# Patient Record
Sex: FEMALE | Race: BLACK OR AFRICAN AMERICAN
[De-identification: names, ages, dates, MRNs, and addresses within clinical notes are randomized per-mention and may not be internally consistent; named-entity substitution may affect disease eponyms.]

---

## 2020-09-08 ENCOUNTER — HOSPITAL ENCOUNTER (OUTPATIENT)
Dept: HOSPITAL 74 - JASU-SURG | Age: 40
Discharge: HOME | End: 2020-09-08
Attending: OBSTETRICS & GYNECOLOGY
Payer: COMMERCIAL

## 2020-09-08 VITALS — HEART RATE: 90 BPM | TEMPERATURE: 97.7 F | SYSTOLIC BLOOD PRESSURE: 110 MMHG | DIASTOLIC BLOOD PRESSURE: 63 MMHG

## 2020-09-08 VITALS — BODY MASS INDEX: 36.2 KG/M2

## 2020-09-08 DIAGNOSIS — D25.0: Primary | ICD-10-CM

## 2020-09-08 DIAGNOSIS — N85.00: ICD-10-CM

## 2020-09-08 DIAGNOSIS — N84.0: ICD-10-CM

## 2020-09-08 DIAGNOSIS — N92.0: ICD-10-CM

## 2020-09-08 PROCEDURE — 0UDB8ZX EXTRACTION OF ENDOMETRIUM, VIA NATURAL OR ARTIFICIAL OPENING ENDOSCOPIC, DIAGNOSTIC: ICD-10-PCS | Performed by: OBSTETRICS & GYNECOLOGY

## 2020-09-08 PROCEDURE — 0UB98ZZ EXCISION OF UTERUS, VIA NATURAL OR ARTIFICIAL OPENING ENDOSCOPIC: ICD-10-PCS | Performed by: OBSTETRICS & GYNECOLOGY

## 2020-09-08 PROCEDURE — 0UB98ZX EXCISION OF UTERUS, VIA NATURAL OR ARTIFICIAL OPENING ENDOSCOPIC, DIAGNOSTIC: ICD-10-PCS | Performed by: OBSTETRICS & GYNECOLOGY

## 2020-09-08 NOTE — OP
Operative Note





- Note:


Operative Date: 09/08/20


Pre-Operative Diagnosis: 41yo P2 with multifibroid uterus, prolonged menses 

anemia


Operation: Hysteroscopy/Myomectomy/Polypectomy/D&C


Findings: 





14wk fibroid uterus


Overgrown, white endometrium


Endometrial polyps and submucosal fibroids


Post-Operative Diagnosis: Same as Pre-op


Surgeon: Leia Torres


Anesthesiologist/CRNA: Rogers Seth


Anesthesia: MAC


Specimens Removed: Fibroid.  Polyp.  Endometrial curettings


Estimated Blood Loss (mls): 5


Instrument used (Debridements only): Symphion Hysteroscope with resectoscope


Drains & Tubes with Location: Fluid defficit 0cc


Drains, Volume Out (mls): 30


Fluid Volume Replaced (mls): 600


Operative Report Dictated: Yes

## 2020-09-08 NOTE — HP
Admitting History and Physical





- Primary Care Physician


PCP: Leia Torres





- Admission


Chief Complaint: 39yo P1 with heavy menses, anemia, fibroid uterus, recieving 

iron transfusion


History of Present Illness: 





Anemia requiring iron transfusion


fibroid uterus


History Source: Patient


Limitations to Obtaining History: No Limitations





- Past Medical History


...LMP: 20


...Pregnant: No


Heme/Onc: Yes: Anemia (Recieving iron transfusions)





- Past Surgical History


Past Surgical History: Yes:  (x2)





- Smoking History


Smoking history: Never smoked


Have you smoked in the past 12 months: No





- Alcohol/Substance Use


Hx Alcohol Use: No


History of Substance Use: reports: None





- Social History


History of Recent Travel: No





Home Medications





- Allergies


Allergies/Adverse Reactions: 


                                    Allergies











Allergy/AdvReac Type Severity Reaction Status Date / Time


 


No Known Allergies Allergy   Verified 20 07:07














- Home Medications


Home Medications: 


Ambulatory Orders





Iron Infusion 1 unit IV ASDIR 20 


Multivitamin [Multiple Vitamins] 1 each PO DAILY 20 


Norethindrone 0.35 mg PO HS 20 











Review of Systems





- Review of Systems


Constitutional: reports: No Symptoms


Eyes: reports: No Symptoms


HENT: reports: No Symptoms


Neck: reports: No Symptoms


Cardiovascular: reports: No Symptoms


Respiratory: reports: No Symptoms


Gastrointestinal: reports: No Symptoms


Genitourinary: reports: Other (heavy, protracted menses)


Breasts: reports: No Symptoms Reported


Musculoskeletal: reports: No Symptoms


Integumentary: reports: No Symptoms


Neurological: reports: No Symptoms


Endocrine: reports: No Symptoms


Hematology/Lymphatic: reports: Excessive Bleeding


Psychiatric: reports: No Symptoms





Physical Examination


Vital Signs: 


                                   Vital Signs











Temperature  96.6 F L  20 07:01


 


Pulse Rate  79   20 07:01


 


Respiratory Rate  18   20 07:01


 


Blood Pressure  112/68   20 07:01


 


O2 Sat by Pulse Oximetry (%)      











Constitutional: Yes: Well Nourished, No Distress, Calm


Eyes: Yes: WNL


HENT: Yes: WNL


Neck: Yes: WNL


Cardiovascular: Yes: WNL


Respiratory: Yes: WNL


Gastrointestinal: Yes: WNL


Musculoskeletal: Yes: WNL


Extremities: Yes: WNL


Edema: No


Integumentary: Yes: WNL


Neurological: Yes: WNL, Alert, Oriented


...Motor Strength: WNL





Assessment/Plan





39yo P1 with fibroid uterus, heavy, protracted menses, anemia


For Hysteroscopy, myomectomy, D&C

## 2020-09-08 NOTE — OP
DATE OF OPERATION:  

 

PREOPERATIVE DIAGNOSIS:  [age] years old, para 2, with multifibroid uterus, prolonged

menses, and anemia.  

 

OPERATION:  Hysteroscopy, myomectomy, polypectomy, dilation and curettage.  

 

FINDINGS:  Fourteen-week fibroid uterus, overgrown white endometrium, endometrial

polyps and submucosal fibroids.  

 

POSTOPERATIVE DIAGNOSIS:  [age] years old, para 2, with multifibroid uterus,

prolonged menses, and anemia.

 

SURGEON:  Regino Kwong MD 

 

ANESTHESIOLOGIST:  Rogers Seth MD 

 

ANESTHESIA:  MAC.  

 

SPECIMENS REMOVED:  Fibroid, polyp, endometrial curettings. 

 

INSTRUMENTS USED:  Symphion hysteroscope with resectoscope.

 

DESCRIPTION OF THE OPERATIVE PROCEDURE:  After assuring informed consent, patient was

brought to the operating room where she was placed in dorsal lithotomy position. 

Vagina and perineum were prepped and draped in sterile fashion.  The cervix was found

to be very anterior and difficult to visualize, so posterior cervical lip was

articulated with single-tooth tenaculum, and cervix was dilated with gradually

increasing in size dilators to accommodate the 6.3-mm Symphion hysteroscope which was

introduced into the cavity after it was primed and white balanced without any

difficulty.  Above findings were noted.  Resectoscope was introduced through the

operative port, and fibroids, polyps, and endometrium were resected.  Excellent

hemostasis was assured.  Subsequently, all instruments were removed from the uterus,

cervix, and vagina.  Sponge and instrument count was correct x2.  Estimated blood

loss was 5 mL.  Fluid deficit was 0.  Approximately a lot of fluid went on the floor

and drapes.  Patient drained 30 mL of urine prior to the procedure and received 600

mL of IV fluids.  

 

 

REGINO KWONG M.D.

 

[id number]

DD: 09/08/2020 11:15

DT: 09/08/2020 13:26

Job #:  [id number]

## 2020-09-09 NOTE — PATH
Surgical Pathology Report



Patient Name:  ALLEN MURRIETA

Accession #:  [id number]

[facility]. Rec. #:  [id number]                                                        

   /Age/Gender:  1980 (Age: 40) / F

Account:  [id number]                                                          

             Location: [facility] SUR[facility]

Taken:  2020

Received:  2020

Reported:  2020

Physicians:  Leia Torres M.D.

  



Specimen(s) Received

 ENDOMETRIAL POLYP, ENDOMETRIAL CURETTINGS, AND FIBROID 





Clinical History

Fibroid uterus, frequent menstruation, anemia, rule out endometrial hyperplasia







Final Diagnosis

ENDOMETRIAL POLYP, ENDOMETRIAL CURETTINGS, AND FIBROID, DILATION AND CURETTAGE:

FRAGMENTS OF ENDOMETRIAL POLYP, SECRETORY ENDOMETRIUM, SCANT BENIGN ENDOCERVICAL

TISSUE, AND FIBROMUSCULAR TISSUE CONSISTENT WITH SUBMUCOSAL LEIOMYOMA.





***Electronically Signed***

Inez Sims M.D.





Gross Description

Received in formalin labeled "endometrial polyp, endometrial curetting and

fibroid," is a 5.5 x 4.0 x 0.5 cm aggregate of tan soft tissue fragments. The

formalin is filtered and the specimen is entirely submitted in 5 cassettes.

/2020



saudi/2020

## 2023-11-03 ENCOUNTER — HOSPITAL ENCOUNTER (EMERGENCY)
Facility: HOSPITAL | Age: 43
Discharge: HOME | End: 2023-11-04
Attending: EMERGENCY MEDICINE
Payer: COMMERCIAL

## 2023-11-03 DIAGNOSIS — N92.0 MENORRHAGIA: ICD-10-CM

## 2023-11-03 DIAGNOSIS — D64.9 ANEMIA: Primary | ICD-10-CM

## 2023-11-03 LAB
ABO GROUP BLD: NORMAL
ABO GROUP BLD: NORMAL
ANION GAP SERPL CALCULATED.3IONS-SCNC: 8 MMOL/L
BASOPHILS # BLD AUTO: 0.04 THOUSANDS/ÂΜL (ref 0–0.1)
BASOPHILS NFR BLD AUTO: 1 % (ref 0–1)
BLD GP AB SCN SERPL QL: NEGATIVE
BUN SERPL-MCNC: 9 MG/DL (ref 5–25)
CALCIUM SERPL-MCNC: 9.2 MG/DL (ref 8.4–10.2)
CHLORIDE SERPL-SCNC: 105 MMOL/L (ref 96–108)
CO2 SERPL-SCNC: 24 MMOL/L (ref 21–32)
CREAT SERPL-MCNC: 1 MG/DL (ref 0.6–1.3)
EOSINOPHIL # BLD AUTO: 0.1 THOUSAND/ÂΜL (ref 0–0.61)
EOSINOPHIL NFR BLD AUTO: 1 % (ref 0–6)
ERYTHROCYTE [DISTWIDTH] IN BLOOD BY AUTOMATED COUNT: 20.2 % (ref 11.6–15.1)
ERYTHROCYTE [SEDIMENTATION RATE] IN BLOOD: 7 MM/HOUR (ref 0–19)
GFR SERPL CREATININE-BSD FRML MDRD: 69 ML/MIN/1.73SQ M
GLUCOSE SERPL-MCNC: 95 MG/DL (ref 65–140)
HCT VFR BLD AUTO: 20.9 % (ref 34.8–46.1)
HGB BLD-MCNC: 5.6 G/DL (ref 11.5–15.4)
IMM GRANULOCYTES # BLD AUTO: 0.05 THOUSAND/UL (ref 0–0.2)
IMM GRANULOCYTES NFR BLD AUTO: 1 % (ref 0–2)
LYMPHOCYTES # BLD AUTO: 2.74 THOUSANDS/ÂΜL (ref 0.6–4.47)
LYMPHOCYTES NFR BLD AUTO: 38 % (ref 14–44)
MCH RBC QN AUTO: 15.9 PG (ref 26.8–34.3)
MCHC RBC AUTO-ENTMCNC: 26.7 G/DL (ref 31.4–37.4)
MCV RBC AUTO: 60 FL (ref 82–98)
MONOCYTES # BLD AUTO: 0.7 THOUSAND/ÂΜL (ref 0.17–1.22)
MONOCYTES NFR BLD AUTO: 10 % (ref 4–12)
NEUTROPHILS # BLD AUTO: 3.6 THOUSANDS/ÂΜL (ref 1.85–7.62)
NEUTS SEG NFR BLD AUTO: 49 % (ref 43–75)
NRBC BLD AUTO-RTO: 1 /100 WBCS
PLATELET # BLD AUTO: 581 THOUSANDS/UL (ref 149–390)
PMV BLD AUTO: 9.1 FL (ref 8.9–12.7)
POTASSIUM SERPL-SCNC: 3.5 MMOL/L (ref 3.5–5.3)
RBC # BLD AUTO: 3.52 MILLION/UL (ref 3.81–5.12)
RH BLD: POSITIVE
RH BLD: POSITIVE
SODIUM SERPL-SCNC: 137 MMOL/L (ref 135–147)
SPECIMEN EXPIRATION DATE: NORMAL
WBC # BLD AUTO: 7.23 THOUSAND/UL (ref 4.31–10.16)

## 2023-11-03 PROCEDURE — 85652 RBC SED RATE AUTOMATED: CPT | Performed by: EMERGENCY MEDICINE

## 2023-11-03 PROCEDURE — 80048 BASIC METABOLIC PNL TOTAL CA: CPT | Performed by: EMERGENCY MEDICINE

## 2023-11-03 PROCEDURE — 86920 COMPATIBILITY TEST SPIN: CPT

## 2023-11-03 PROCEDURE — P9016 RBC LEUKOCYTES REDUCED: HCPCS

## 2023-11-03 PROCEDURE — 86901 BLOOD TYPING SEROLOGIC RH(D): CPT | Performed by: EMERGENCY MEDICINE

## 2023-11-03 PROCEDURE — 86900 BLOOD TYPING SEROLOGIC ABO: CPT | Performed by: EMERGENCY MEDICINE

## 2023-11-03 PROCEDURE — 36430 TRANSFUSION BLD/BLD COMPNT: CPT

## 2023-11-03 PROCEDURE — 99283 EMERGENCY DEPT VISIT LOW MDM: CPT

## 2023-11-03 PROCEDURE — 99285 EMERGENCY DEPT VISIT HI MDM: CPT | Performed by: EMERGENCY MEDICINE

## 2023-11-03 PROCEDURE — 86850 RBC ANTIBODY SCREEN: CPT | Performed by: EMERGENCY MEDICINE

## 2023-11-03 PROCEDURE — 85025 COMPLETE CBC W/AUTO DIFF WBC: CPT | Performed by: EMERGENCY MEDICINE

## 2023-11-03 PROCEDURE — 36415 COLL VENOUS BLD VENIPUNCTURE: CPT | Performed by: EMERGENCY MEDICINE

## 2023-11-03 NOTE — Clinical Note
Juaquin Raymundo was seen and treated in our emergency department on 11/3/2023. Diagnosis:     Elif Fu  . She may return on this date: 11/06/2023         If you have any questions or concerns, please don't hesitate to call.       Juan R Arroyo RN    ______________________________           _______________          _______________  Hospital Representative                              Date                                Time

## 2023-11-04 VITALS
DIASTOLIC BLOOD PRESSURE: 62 MMHG | HEART RATE: 87 BPM | OXYGEN SATURATION: 98 % | WEIGHT: 225 LBS | BODY MASS INDEX: 41.41 KG/M2 | HEIGHT: 62 IN | RESPIRATION RATE: 18 BRPM | SYSTOLIC BLOOD PRESSURE: 100 MMHG | TEMPERATURE: 98 F

## 2023-11-04 LAB
ABO GROUP BLD BPU: NORMAL
ABO GROUP BLD BPU: NORMAL
BPU ID: NORMAL
BPU ID: NORMAL
CROSSMATCH: NORMAL
CROSSMATCH: NORMAL
UNIT DISPENSE STATUS: NORMAL
UNIT DISPENSE STATUS: NORMAL
UNIT PRODUCT CODE: NORMAL
UNIT PRODUCT CODE: NORMAL
UNIT PRODUCT VOLUME: 350 ML
UNIT PRODUCT VOLUME: 350 ML
UNIT RH: NORMAL
UNIT RH: NORMAL

## 2023-11-04 PROCEDURE — P9016 RBC LEUKOCYTES REDUCED: HCPCS

## 2023-11-04 NOTE — DISCHARGE INSTRUCTIONS
Please follow up PCP and gyn. Recommend tylenol 650 mg and ibuprofen 600 mg every 6 hours as needed for pain. Please return for severe chest pain, significant shortness of breath, severely worsening symptoms, or any other concerning signs or symptoms. Please refer to the following documents for additional instructions and return precautions.

## 2023-11-04 NOTE — ED PROVIDER NOTES
History  Chief Complaint   Patient presents with    Abnormal Lab     Pt got a call from her hematologist who told the pt that her hgb is 5.5 and that she needs to be seen in the ER for a blood transfusion      27-year-old female history of known anemia from menorrhagia presenting with low blood count. Patient with known anemia and sees hematology for iron transfusions secondary to frequent menorrhagia. Patient with outpatient blood testing with hemoglobin of 5.6. Patient reports generalized weakness and fatigue. Denies any chest pain shortness of breath or any lightheadedness. Denies any other complaints. Chart reviewed. History reviewed. No pertinent past medical history. Family History: non-contributory  Social History          None       History reviewed. No pertinent past medical history. History reviewed. No pertinent surgical history. History reviewed. No pertinent family history. I have reviewed and agree with the history as documented. E-Cigarette/Vaping     E-Cigarette/Vaping Substances     Social History     Tobacco Use    Smoking status: Never    Smokeless tobacco: Never   Substance Use Topics    Alcohol use: Never    Drug use: Never       Review of Systems   Constitutional:  Negative for appetite change, chills, diaphoresis, fever and unexpected weight change. HENT:  Negative for congestion and rhinorrhea. Eyes:  Negative for photophobia and visual disturbance. Respiratory:  Negative for cough, chest tightness and shortness of breath. Cardiovascular:  Negative for chest pain, palpitations and leg swelling. Gastrointestinal:  Negative for abdominal distention, abdominal pain, blood in stool, constipation, diarrhea, nausea and vomiting. Genitourinary:  Positive for vaginal bleeding. Negative for dysuria and hematuria. Musculoskeletal:  Negative for back pain, joint swelling, neck pain and neck stiffness. Skin:  Negative for color change, pallor, rash and wound. Neurological:  Negative for dizziness, syncope, weakness, light-headedness and headaches. Psychiatric/Behavioral:  Negative for agitation. All other systems reviewed and are negative. Physical Exam  Physical Exam  Vitals and nursing note reviewed. Constitutional:       General: She is not in acute distress. Appearance: Normal appearance. She is well-developed. She is not ill-appearing, toxic-appearing or diaphoretic. HENT:      Head: Normocephalic and atraumatic. Nose: Nose normal. No congestion or rhinorrhea. Mouth/Throat:      Mouth: Mucous membranes are moist.      Pharynx: Oropharynx is clear. No oropharyngeal exudate or posterior oropharyngeal erythema. Eyes:      General: No scleral icterus. Right eye: No discharge. Left eye: No discharge. Extraocular Movements: Extraocular movements intact. Conjunctiva/sclera: Conjunctivae normal.      Pupils: Pupils are equal, round, and reactive to light. Neck:      Vascular: No JVD. Trachea: No tracheal deviation. Comments: Supple. Normal range of motion. Cardiovascular:      Rate and Rhythm: Normal rate and regular rhythm. Heart sounds: Normal heart sounds. No murmur heard. No friction rub. No gallop. Comments: Normal rate and regular rhythm  Pulmonary:      Effort: Pulmonary effort is normal. No respiratory distress. Breath sounds: Normal breath sounds. No stridor. No wheezing or rales. Comments: Clear to auscultation bilaterally  Chest:      Chest wall: No tenderness. Abdominal:      General: Bowel sounds are normal. There is no distension. Palpations: Abdomen is soft. Tenderness: There is no abdominal tenderness. There is no right CVA tenderness, left CVA tenderness, guarding or rebound. Comments: Soft, nontender, nondistended. Normal bowel sounds throughout   Musculoskeletal:         General: No swelling, tenderness, deformity or signs of injury.  Normal range of motion. Cervical back: Normal range of motion and neck supple. No rigidity. No muscular tenderness. Right lower leg: No edema. Left lower leg: No edema. Lymphadenopathy:      Cervical: No cervical adenopathy. Skin:     General: Skin is warm and dry. Coloration: Skin is not pale. Findings: No erythema or rash. Neurological:      General: No focal deficit present. Mental Status: She is alert. Mental status is at baseline. Sensory: No sensory deficit. Motor: No weakness or abnormal muscle tone. Coordination: Coordination normal.      Gait: Gait normal.      Comments: Alert. Strength and sensation grossly intact. Ambulatory without difficulty at baseline. Psychiatric:         Behavior: Behavior normal.         Thought Content:  Thought content normal.         Vital Signs  ED Triage Vitals [11/03/23 1929]   Temperature Pulse Respirations Blood Pressure SpO2   99 °F (37.2 °C) (!) 116 17 123/61 98 %      Temp Source Heart Rate Source Patient Position - Orthostatic VS BP Location FiO2 (%)   Oral Monitor -- Left arm --      Pain Score       No Pain           Vitals:    11/03/23 1929 11/03/23 2200 11/03/23 2222   BP: 123/61 109/61 105/61   Pulse: (!) 116 100 101         Visual Acuity  Visual Acuity      Flowsheet Row Most Recent Value   L Pupil Size (mm) 3   R Pupil Size (mm) 3            ED Medications  Medications - No data to display    Diagnostic Studies  Results Reviewed       Procedure Component Value Units Date/Time    CBC and differential [[id number]]  (Abnormal) Collected: 11/03/23 1951    Lab Status: Final result Specimen: Blood from Arm, Right Updated: 11/03/23 2030     WBC 7.23 Thousand/uL      RBC 3.52 Million/uL      Hemoglobin 5.6 g/dL      Hematocrit 20.9 %      MCV 60 fL      MCH 15.9 pg      MCHC 26.7 g/dL      RDW 20.2 %      MPV 9.1 fL      Platelets 036 Thousands/uL      nRBC 1 /100 WBCs      Neutrophils Relative 49 %      Immat GRANS % 1 % Lymphocytes Relative 38 %      Monocytes Relative 10 %      Eosinophils Relative 1 %      Basophils Relative 1 %      Neutrophils Absolute 3.60 Thousands/µL      Immature Grans Absolute 0.05 Thousand/uL      Lymphocytes Absolute 2.74 Thousands/µL      Monocytes Absolute 0.70 Thousand/µL      Eosinophils Absolute 0.10 Thousand/µL      Basophils Absolute 0.04 Thousands/µL     Narrative: This is an appended report. These results have been appended to a previously verified report. Basic metabolic panel [[id number]] Collected: 11/03/23 1951    Lab Status: Final result Specimen: Blood from Arm, Right Updated: 11/03/23 2029     Sodium 137 mmol/L      Potassium 3.5 mmol/L      Chloride 105 mmol/L      CO2 24 mmol/L      ANION GAP 8 mmol/L      BUN 9 mg/dL      Creatinine 1.00 mg/dL      Glucose 95 mg/dL      Calcium 9.2 mg/dL      eGFR 69 ml/min/1.73sq m     Narrative:      [facility] guidelines for Chronic Kidney Disease (CKD):     Stage 1 with normal or high GFR (GFR > 90 mL/min/1.73 square meters)    Stage 2 Mild CKD (GFR = 60-89 mL/min/1.73 square meters)    Stage 3A Moderate CKD (GFR = 45-59 mL/min/1.73 square meters)    Stage 3B Moderate CKD (GFR = 30-44 mL/min/1.73 square meters)    Stage 4 Severe CKD (GFR = 15-29 mL/min/1.73 square meters)    Stage 5 End Stage CKD (GFR <15 mL/min/1.73 square meters)  Note: GFR calculation is accurate only with a steady state creatinine    Sedimentation rate, automated [[id number]]  (Normal) Collected: 11/03/23 1951    Lab Status: Final result Specimen: Blood from Arm, Right Updated: 11/03/23 2016     Sed Rate 7 mm/hour                    No orders to display              Procedures  Procedures         ED Course                               SBIRT 22yo+      Flowsheet Row Most Recent Value   Initial Alcohol Screen: US AUDIT-C     1. How often do you have a drink containing alcohol? 0 Filed at: 11/03/2023 1931   2.  How many drinks containing alcohol do you have on a typical day you are drinking? 0 Filed at: 11/03/2023 1931   3b. FEMALE Any Age, or MALE 65+: How often do you have 4 or more drinks on one occassion? 0 Filed at: 11/03/2023 1931   Audit-C Score 0 Filed at: 11/03/2023 1931   RASHID: How many times in the past year have you. .. Used an illegal drug or used a prescription medication for non-medical reasons? Never Filed at: 11/03/2023 1931                      Medical Decision Making  49-year-old female history of known anemia from menorrhagia presenting with low blood count. Anemia likely secondary to menorrhagia. No other reported bleeding. Plan for blood testing and type and screen. Reassess. Hemoglobin today mid fives. Plan for transfusion of 2 units PRBCs. No issues during transfusion. Discussed results and recommendations. Advised follow up PCP, GYN, hematology. Medication recommendations. Given instructions and return precautions. Patient/family at bedside acknowledged understanding of all written and verbal instructions and return precautions. Discharged. Amount and/or Complexity of Data Reviewed  Labs: ordered. Disposition  Final diagnoses:   Anemia   Menorrhagia     Time reflects when diagnosis was documented in both MDM as applicable and the Disposition within this note       Time User Action Codes Description Comment    11/3/2023 10:16 PM Verdel Ganser Add [D64.9] Anemia     11/3/2023 10:16 PM Verdel Ganser Add [N92.0] Menorrhagia           ED Disposition       ED Disposition   Discharge    Condition   Stable    Date/Time   Fri Nov 3, 2023 2216    Comment   Gwenith Severe discharge to home/self care.                    Follow-up Information       Follow up With Specialties Details Why Contact Info Additional Information    Raulito Mills MD Hematology, Internal Medicine Schedule an appointment as soon as possible for a visit in 1 week  [address]  [address] Gynecology [facility] Obstetrics and Gynecology Schedule an appointment as soon as possible for a visit in 1 week  [address] [facility], [address] [place] [address]            Patient's Medications    No medications on file       No discharge procedures on file.     PDMP Review       None            ED Provider  Electronically Signed by             Christina Guillen MD  11/03/23 9644

## 2025-03-11 ENCOUNTER — HOSPITAL ENCOUNTER (EMERGENCY)
Facility: HOSPITAL | Age: 45
Discharge: HOME | End: 2025-03-12
Attending: STUDENT IN AN ORGANIZED HEALTH CARE EDUCATION/TRAINING PROGRAM
Payer: COMMERCIAL

## 2025-03-11 DIAGNOSIS — D50.9 IRON DEFICIENCY ANEMIA: ICD-10-CM

## 2025-03-11 DIAGNOSIS — D64.9 SYMPTOMATIC ANEMIA: Primary | ICD-10-CM

## 2025-03-11 LAB
ABO GROUP BLD: NORMAL
ANION GAP SERPL CALCULATED.3IONS-SCNC: 5 MMOL/L (ref 4–13)
BASOPHILS # BLD AUTO: 0.04 THOUSANDS/ÂΜL (ref 0–0.1)
BASOPHILS NFR BLD AUTO: 1 % (ref 0–1)
BLD GP AB SCN SERPL QL: NEGATIVE
BUN SERPL-MCNC: 12 MG/DL (ref 5–25)
CALCIUM SERPL-MCNC: 9.4 MG/DL (ref 8.4–10.2)
CHLORIDE SERPL-SCNC: 107 MMOL/L (ref 96–108)
CO2 SERPL-SCNC: 27 MMOL/L (ref 21–32)
CREAT SERPL-MCNC: 0.85 MG/DL (ref 0.6–1.3)
EOSINOPHIL # BLD AUTO: 0.11 THOUSAND/ÂΜL (ref 0–0.61)
EOSINOPHIL NFR BLD AUTO: 3 % (ref 0–6)
ERYTHROCYTE [DISTWIDTH] IN BLOOD BY AUTOMATED COUNT: 19.8 % (ref 11.6–15.1)
GFR SERPL CREATININE-BSD FRML MDRD: 83 ML/MIN/1.73SQ M
GLUCOSE SERPL-MCNC: 96 MG/DL (ref 65–140)
HCT VFR BLD AUTO: 22.9 % (ref 34.8–46.1)
HGB BLD-MCNC: 6.1 G/DL (ref 11.5–15.4)
IMM GRANULOCYTES # BLD AUTO: 0.01 THOUSAND/UL (ref 0–0.2)
IMM GRANULOCYTES NFR BLD AUTO: 0 % (ref 0–2)
LYMPHOCYTES # BLD AUTO: 1.79 THOUSANDS/ÂΜL (ref 0.6–4.47)
LYMPHOCYTES NFR BLD AUTO: 40 % (ref 14–44)
MCH RBC QN AUTO: 15.8 PG (ref 26.8–34.3)
MCHC RBC AUTO-ENTMCNC: 26.6 G/DL (ref 31.4–37.4)
MCV RBC AUTO: 59 FL (ref 82–98)
MONOCYTES # BLD AUTO: 0.5 THOUSAND/ÂΜL (ref 0.17–1.22)
MONOCYTES NFR BLD AUTO: 11 % (ref 4–12)
NEUTROPHILS # BLD AUTO: 2.02 THOUSANDS/ÂΜL (ref 1.85–7.62)
NEUTS SEG NFR BLD AUTO: 45 % (ref 43–75)
NRBC BLD AUTO-RTO: 0 /100 WBCS
PLATELET # BLD AUTO: 482 THOUSANDS/UL (ref 149–390)
PMV BLD AUTO: 9.2 FL (ref 8.9–12.7)
POTASSIUM SERPL-SCNC: 3.9 MMOL/L (ref 3.5–5.3)
RBC # BLD AUTO: 3.87 MILLION/UL (ref 3.81–5.12)
RH BLD: POSITIVE
SODIUM SERPL-SCNC: 139 MMOL/L (ref 135–147)
SPECIMEN EXPIRATION DATE: NORMAL
WBC # BLD AUTO: 4.47 THOUSAND/UL (ref 4.31–10.16)

## 2025-03-11 PROCEDURE — 82728 ASSAY OF FERRITIN: CPT | Performed by: STUDENT IN AN ORGANIZED HEALTH CARE EDUCATION/TRAINING PROGRAM

## 2025-03-11 PROCEDURE — 86923 COMPATIBILITY TEST ELECTRIC: CPT

## 2025-03-11 PROCEDURE — 36415 COLL VENOUS BLD VENIPUNCTURE: CPT | Performed by: STUDENT IN AN ORGANIZED HEALTH CARE EDUCATION/TRAINING PROGRAM

## 2025-03-11 PROCEDURE — 86901 BLOOD TYPING SEROLOGIC RH(D): CPT | Performed by: STUDENT IN AN ORGANIZED HEALTH CARE EDUCATION/TRAINING PROGRAM

## 2025-03-11 PROCEDURE — 99284 EMERGENCY DEPT VISIT MOD MDM: CPT | Performed by: STUDENT IN AN ORGANIZED HEALTH CARE EDUCATION/TRAINING PROGRAM

## 2025-03-11 PROCEDURE — 85025 COMPLETE CBC W/AUTO DIFF WBC: CPT | Performed by: STUDENT IN AN ORGANIZED HEALTH CARE EDUCATION/TRAINING PROGRAM

## 2025-03-11 PROCEDURE — 80048 BASIC METABOLIC PNL TOTAL CA: CPT | Performed by: STUDENT IN AN ORGANIZED HEALTH CARE EDUCATION/TRAINING PROGRAM

## 2025-03-11 PROCEDURE — 36430 TRANSFUSION BLD/BLD COMPNT: CPT

## 2025-03-11 PROCEDURE — 83540 ASSAY OF IRON: CPT | Performed by: STUDENT IN AN ORGANIZED HEALTH CARE EDUCATION/TRAINING PROGRAM

## 2025-03-11 PROCEDURE — P9016 RBC LEUKOCYTES REDUCED: HCPCS

## 2025-03-11 PROCEDURE — 86900 BLOOD TYPING SEROLOGIC ABO: CPT | Performed by: STUDENT IN AN ORGANIZED HEALTH CARE EDUCATION/TRAINING PROGRAM

## 2025-03-11 PROCEDURE — 86850 RBC ANTIBODY SCREEN: CPT | Performed by: STUDENT IN AN ORGANIZED HEALTH CARE EDUCATION/TRAINING PROGRAM

## 2025-03-11 PROCEDURE — 83550 IRON BINDING TEST: CPT | Performed by: STUDENT IN AN ORGANIZED HEALTH CARE EDUCATION/TRAINING PROGRAM

## 2025-03-11 PROCEDURE — 99283 EMERGENCY DEPT VISIT LOW MDM: CPT

## 2025-03-12 ENCOUNTER — RESULTS FOLLOW-UP (OUTPATIENT)
Dept: EMERGENCY DEPT | Facility: HOSPITAL | Age: 45
End: 2025-03-12

## 2025-03-12 VITALS
WEIGHT: 225 LBS | BODY MASS INDEX: 41.41 KG/M2 | OXYGEN SATURATION: 98 % | DIASTOLIC BLOOD PRESSURE: 59 MMHG | RESPIRATION RATE: 19 BRPM | TEMPERATURE: 98 F | HEIGHT: 62 IN | HEART RATE: 90 BPM | SYSTOLIC BLOOD PRESSURE: 108 MMHG

## 2025-03-12 LAB
ABO GROUP BLD BPU: NORMAL
BPU ID: NORMAL
CROSSMATCH: NORMAL
FERRITIN SERPL-MCNC: 1 NG/ML (ref 11–307)
IRON SATN MFR SERPL: 2 % (ref 15–50)
IRON SERPL-MCNC: 11 UG/DL (ref 50–212)
TIBC SERPL-MCNC: 527.8 UG/DL (ref 250–450)
TRANSFERRIN SERPL-MCNC: 377 MG/DL (ref 203–362)
UIBC SERPL-MCNC: 517 UG/DL (ref 155–355)
UNIT DISPENSE STATUS: NORMAL
UNIT PRODUCT CODE: NORMAL
UNIT PRODUCT VOLUME: 350 ML
UNIT RH: NORMAL

## 2025-03-12 RX ORDER — FERROUS SULFATE 324(65)MG
324 TABLET, DELAYED RELEASE (ENTERIC COATED) ORAL EVERY OTHER DAY
Qty: 30 TABLET | Refills: 0 | Status: SHIPPED | OUTPATIENT
Start: 2025-03-12

## 2025-03-12 NOTE — ED PROVIDER NOTES
Time reflects when diagnosis was documented in both MDM as applicable and the Disposition within this note       Time User Action Codes Description Comment    3/12/2025 12:13 AM Elizabeth Valle Add [D64.9] Symptomatic anemia           ED Disposition       ED Disposition   Discharge    Condition   Stable    Date/Time   Wed Mar 12, 2025 12:13 AM    Comment   Kathyindra Michele discharge to home/self care.                   Assessment & Plan       Medical Decision Making  Differential symptomatic anemia, low iron    Patient is a well-appearing 44-year-old female present for department no acute respiratory distress and vital signs unremarkable.  Patient has no signs of any active bleeding at this time.  Discussed labs and transfusion.  Patient requested labs that her provider wanted to be drawn.  An iron panel was drawn.  Discussed symptomatic management as well as return fall precautions.  Disposition discharge.  Reports clear follow-up with her heme team.    Amount and/or Complexity of Data Reviewed  Labs: ordered.             Medications - No data to display    ED Risk Strat Scores                            SBIRT 20yo+      Flowsheet Row Most Recent Value   Initial Alcohol Screen: US AUDIT-C     1. How often do you have a drink containing alcohol? 0 Filed at: 03/11/2025 2118   2. How many drinks containing alcohol do you have on a typical day you are drinking?  0 Filed at: 03/11/2025 2118   3b. FEMALE Any Age, or MALE 65+: How often do you have 4 or more drinks on one occassion? 0 Filed at: 03/11/2025 2118   Audit-C Score 0 Filed at: 03/11/2025 2118   RASHID: How many times in the past year have you...    Used an illegal drug or used a prescription medication for non-medical reasons? Never Filed at: 03/11/2025 2118                            History of Present Illness       Chief Complaint   Patient presents with    Abnormal Lab     Pt arrives c/o low HGB labs. Pt reports Hx of anemia. Reports increased fatigue        History reviewed. No pertinent past medical history.   History reviewed. No pertinent surgical history.   History reviewed. No pertinent family history.   Social History     Tobacco Use    Smoking status: Never    Smokeless tobacco: Never   Substance Use Topics    Alcohol use: Never    Drug use: Never      E-Cigarette/Vaping      E-Cigarette/Vaping Substances      I have reviewed and agree with the history as documented.     HPI    Patient is a 44-year-old female present emerged department for abnormal lab work.  Patient reports a hemoglobin of 6 done at an outside facility.  Patient follows with hematology and has a known history of anemia.  She previously had transfusions done for iron.  Patient does report some generalized weakness.  Reports last blood transfusion being performed approximately 2 years ago.  Reports normal hemoglobin is around 7.  Denies any chest pain shortness of breath or difficulty breathing.  Review of systems otherwise negative.    Review of Systems   Constitutional:  Negative for chills and fever.   HENT:  Negative for ear pain and sore throat.    Eyes:  Negative for pain and visual disturbance.   Respiratory:  Negative for cough and shortness of breath.    Cardiovascular:  Negative for chest pain and palpitations.   Gastrointestinal:  Negative for abdominal pain and vomiting.   Genitourinary:  Negative for dysuria and hematuria.   Musculoskeletal:  Negative for arthralgias and back pain.   Skin:  Negative for color change and rash.   Neurological:  Positive for weakness. Negative for seizures and syncope.   All other systems reviewed and are negative.          Objective       ED Triage Vitals   Temperature Pulse Blood Pressure Respirations SpO2 Patient Position - Orthostatic VS   03/11/25 2012 03/11/25 2012 03/11/25 2012 03/11/25 2012 03/11/25 2012 03/11/25 2300   98.6 °F (37 °C) (!) 109 122/67 20 99 % Sitting      Temp Source Heart Rate Source BP Location FiO2 (%) Pain Score     03/11/25 2012 03/11/25 2012 03/11/25 2300 -- --    Temporal Monitor Right arm        Vitals      Date and Time Temp Pulse SpO2 Resp BP Pain Score FACES Pain Rating User   03/12/25 0048 97.8 °F (36.6 °C) 85 98 % 19 107/64 -- -- ES   03/12/25 0018 97.7 °F (36.5 °C) 93 99 % 19 111/65 -- -- KM   03/12/25 0003 98 °F (36.7 °C) 89 99 % 19 104/59 -- -- ES   03/11/25 2353 98.1 °F (36.7 °C) 90 97 % 20 103/53 -- -- ES   03/11/25 2300 98.1 °F (36.7 °C) 93 100 % 20 118/66 -- -- ES   03/11/25 2012 98.6 °F (37 °C) 109 99 % 20 122/67 -- -- OLEGARIO            Physical Exam  Vitals and nursing note reviewed.   Constitutional:       General: She is not in acute distress.     Appearance: She is well-developed.   HENT:      Head: Normocephalic and atraumatic.   Eyes:      Conjunctiva/sclera: Conjunctivae normal.   Cardiovascular:      Rate and Rhythm: Normal rate and regular rhythm.      Heart sounds: No murmur heard.  Pulmonary:      Effort: Pulmonary effort is normal. No respiratory distress.      Breath sounds: Normal breath sounds.   Abdominal:      Palpations: Abdomen is soft.      Tenderness: There is no abdominal tenderness.   Musculoskeletal:         General: No swelling.      Cervical back: Neck supple.   Skin:     General: Skin is warm and dry.      Capillary Refill: Capillary refill takes less than 2 seconds.   Neurological:      General: No focal deficit present.      Mental Status: She is alert and oriented to person, place, and time.   Psychiatric:         Mood and Affect: Mood normal.         Results Reviewed       Procedure Component Value Units Date/Time    Basic metabolic panel [[id number]] Collected: 03/11/25 2118    Lab Status: Final result Specimen: Blood from Arm, Left Updated: 03/11/25 2148     Sodium 139 mmol/L      Potassium 3.9 mmol/L      Chloride 107 mmol/L      CO2 27 mmol/L      ANION GAP 5 mmol/L      BUN 12 mg/dL      Creatinine 0.85 mg/dL      Glucose 96 mg/dL      Calcium 9.4 mg/dL      eGFR 83  ml/min/1.73sq m     Narrative:      [employer] guidelines for Chronic Kidney Disease (CKD):     Stage 1 with normal or high GFR (GFR > 90 mL/min/1.73 square meters)    Stage 2 Mild CKD (GFR = 60-89 mL/min/1.73 square meters)    Stage 3A Moderate CKD (GFR = 45-59 mL/min/1.73 square meters)    Stage 3B Moderate CKD (GFR = 30-44 mL/min/1.73 square meters)    Stage 4 Severe CKD (GFR = 15-29 mL/min/1.73 square meters)    Stage 5 End Stage CKD (GFR <15 mL/min/1.73 square meters)  Note: GFR calculation is accurate only with a steady state creatinine    CBC and differential [[id number]]  (Abnormal) Collected: 03/11/25 2118    Lab Status: Final result Specimen: Blood from Arm, Left Updated: 03/11/25 2127     WBC 4.47 Thousand/uL      RBC 3.87 Million/uL      Hemoglobin 6.1 g/dL      Hematocrit 22.9 %      MCV 59 fL      MCH 15.8 pg      MCHC 26.6 g/dL      RDW 19.8 %      MPV 9.2 fL      Platelets 482 Thousands/uL      nRBC 0 /100 WBCs      Segmented % 45 %      Immature Grans % 0 %      Lymphocytes % 40 %      Monocytes % 11 %      Eosinophils Relative 3 %      Basophils Relative 1 %      Absolute Neutrophils 2.02 Thousands/µL      Absolute Immature Grans 0.01 Thousand/uL      Absolute Lymphocytes 1.79 Thousands/µL      Absolute Monocytes 0.50 Thousand/µL      Eosinophils Absolute 0.11 Thousand/µL      Basophils Absolute 0.04 Thousands/µL     Ferritin [[id number]] Collected: 03/11/25 2118    Lab Status: In process Specimen: Blood from Arm, Left Updated: 03/11/25 2125    TIBC Panel (incl. Iron, TIBC, % Iron Saturation) [[id number]] Collected: 03/11/25 2118    Lab Status: In process Specimen: Blood from Arm, Left Updated: 03/11/25 2125            No orders to display       Procedures    ED Medication and Procedure Management   None     Patient's Medications    No medications on file     No discharge procedures on file.  ED SEPSIS DOCUMENTATION   Time reflects when diagnosis was documented in both MDM as  applicable and the Disposition within this note       Time User Action Codes Description Comment    3/12/2025 12:13 AM Elizabeth Valle Add [D64.9] Symptomatic anemia                  Elizabeth Valle DO  03/12/25 0127

## 2025-03-12 NOTE — DISCHARGE INSTRUCTIONS
Labs currently pending is the iron panel that your provider recommended.  Please utilize [facility] [facility] in order to access and give them updates.    Follow-up with your hematology team/primary care provider.    Return for any new or worsening symptoms.